# Patient Record
Sex: FEMALE | Race: WHITE | NOT HISPANIC OR LATINO | Employment: UNEMPLOYED | URBAN - METROPOLITAN AREA
[De-identification: names, ages, dates, MRNs, and addresses within clinical notes are randomized per-mention and may not be internally consistent; named-entity substitution may affect disease eponyms.]

---

## 2022-07-26 ENCOUNTER — HOSPITAL ENCOUNTER (EMERGENCY)
Facility: CLINIC | Age: 37
Discharge: HOME | End: 2022-07-26
Admitting: NURSE PRACTITIONER

## 2022-07-26 VITALS
HEIGHT: 65 IN | RESPIRATION RATE: 18 BRPM | OXYGEN SATURATION: 100 % | SYSTOLIC BLOOD PRESSURE: 158 MMHG | TEMPERATURE: 98.4 F | WEIGHT: 190 LBS | BODY MASS INDEX: 31.65 KG/M2 | HEART RATE: 92 BPM | DIASTOLIC BLOOD PRESSURE: 107 MMHG

## 2022-07-26 DIAGNOSIS — J02.9 ACUTE PHARYNGITIS, UNSPECIFIED ETIOLOGY: ICD-10-CM

## 2022-07-26 LAB
DEPRECATED S PYO AG THROAT QL EIA: NEGATIVE
GROUP A STREP BY PCR: NOT DETECTED

## 2022-07-26 PROCEDURE — 87651 STREP A DNA AMP PROBE: CPT | Performed by: NURSE PRACTITIONER

## 2022-07-26 PROCEDURE — 99283 EMERGENCY DEPT VISIT LOW MDM: CPT

## 2022-07-26 PROCEDURE — 250N000011 HC RX IP 250 OP 636: Performed by: NURSE PRACTITIONER

## 2022-07-26 RX ADMIN — DEXAMETHASONE 10 MG: 2 TABLET ORAL at 11:32

## 2022-07-26 ASSESSMENT — ENCOUNTER SYMPTOMS
VOMITING: 0
RHINORRHEA: 1
SHORTNESS OF BREATH: 1
FEVER: 0
CHILLS: 0
NECK STIFFNESS: 0
DIARRHEA: 0
SORE THROAT: 1

## 2022-07-26 NOTE — DISCHARGE INSTRUCTIONS
We believe that a viral infection is the cause of your sore throat today.  Your STREP test is NEGATIVE.  The pain is from the inflammation caused by theviral infection.  Antibiotics do not treat viruses and they do not help with the inflammation which is causing your pain.       TO RELIEVE YOUR SYMPTOMS AT HOME:  You should take ibuprofen, 600mg, threetimes per day as needed for pain.  You can also use acetaminophen, 650 mg, up to four times per day as needed for pain.  You can use these medications together, there are no concerning interactions.    Use warm salt water gargles at least 3 times a day to relieve pain.  Sucking cough lozenges during the day can also help reduce pain from sore throat.  Start Prilosec OTC symptoms could be related to acid reflux  Recheck in clinic within 1-2 week.  If your symptoms worsen prior to your follow up appointment, do not hesitate to return here to the emergency department for further evaluation. We'd be happy to see you again.   ---------------------------------------------------------------------------------------------------

## 2022-07-26 NOTE — ED TRIAGE NOTES
Patient reports that she was dx with Covid on the 13th, since then has had a sore throat that has not gone away, taking OTC analgesics without relief. Rupinder Sam RN.......7/26/2022 11:10 AM     Triage Assessment     Row Name 07/26/22 1110       Triage Assessment (Adult)    Airway WDL WDL       Respiratory WDL    Respiratory WDL WDL       Skin Circulation/Temperature WDL    Skin Circulation/Temperature WDL WDL       Cardiac WDL    Cardiac WDL WDL       Peripheral/Neurovascular WDL    Peripheral Neurovascular WDL WDL       Cognitive/Neuro/Behavioral WDL    Cognitive/Neuro/Behavioral WDL WDL

## 2022-07-26 NOTE — ED PROVIDER NOTES
EMERGENCY DEPARTMENT ENCOUNTER      NAME: Tessa Wharton  AGE: 37 year old female  YOB: 1985  MRN: [id number]  EVALUATION DATE & TIME: 2022 11:12 AM    PCP: No primary care provider on file.    ED PROVIDER: KATE Noel, CNP      Chief Complaint   Patient presents with     Pharyngitis         FINAL IMPRESSION:  1. Acute pharyngitis, unspecified etiology          ED COURSE & MEDICAL DECISION MAKIN:19 AM I met with the patient, obtained history, performed an initial exam, and discussed options and plan for treatment here in the ED.  12:31 PM updated patient.     Pertinent Labs & Imaging studies reviewed. (See chart for details)  37 year old female presents to the Emergency Department for evaluation of sore throat.  Symptoms for 2 weeks.  Minimal injection on exam.  No exudate or evidence for peritonsillar abscess.  She has no stridor or dysphonia.  Low suspicion for significant soft tissue infection and work-up was deferred.  Given dose of Decadron.  Did endorse some issues with acid reflux and discussed starting Prilosec OTC.  Otherwise, symptomatic management recommended.  Clinic follow-up in 1 to 2 weeks.  Provided with return precautions    At the conclusion of the encounter I discussed the results of all of the tests and the disposition. The questions were answered. The patient or family acknowledged understanding and was agreeable with the care plan.       MEDICATIONS GIVEN IN THE EMERGENCY:  Medications   dexamethasone (DECADRON) tablet 10 mg (10 mg Oral Given 22 1132)       NEW PRESCRIPTIONS STARTED AT TODAY'S ER VISIT  New Prescriptions    No medications on file            =================================================================    HPI    Patient information was obtained from: patient    Use of Intrepreter: N/A        Tessa Wharton is a 37 year old female who presents today for evaluation of sore throat.  Symptoms present for the past 2 weeks.  Did have  "COVID which was diagnosed on 7/13/2022.  Notes that fever and cough are resolved.  Continues to have sore throat which actually felt worse last night and today.  Pain increases with swallowing.  Last night she felt like she had some difficulty breathing.  Denies any ongoing vomiting or diarrhea.  Appetite has been improving.  Using over-the-counter medicines and drinking hot tea for symptom management.  Here requesting a strep test.      REVIEW OF SYSTEMS   Review of Systems   Constitutional: Negative for chills and fever.   HENT: Positive for rhinorrhea and sore throat.    Respiratory: Positive for shortness of breath (last night).    Gastrointestinal: Negative for diarrhea and vomiting.   Musculoskeletal: Negative for neck stiffness.   All other systems reviewed and are negative.      PAST MEDICAL HISTORY:  No past medical history on file.    PAST SURGICAL HISTORY:  No past surgical history on file.        CURRENT MEDICATIONS:    No current facility-administered medications for this encounter.     No current outpatient medications on file.         ALLERGIES:  No Known Allergies    FAMILY HISTORY:  No family history on file.    SOCIAL HISTORY:   Social History     Socioeconomic History     Marital status: Single         VITALS:  Patient Vitals for the past 24 hrs:   BP Temp Temp src Pulse Resp SpO2 Height Weight   07/26/22 1108 (!) 158/107 98.4  F (36.9  C) Oral 92 18 100 % 1.651 m (5' 5\") 86.2 kg (190 lb)       PHYSICAL EXAM    Constitutional:  Well developed, well nourished, no acute distress  EYES: Conjunctivae clear  HENT:  Atraumatic, normocephalic.  Tonsils are 1+.  Minimal erythema.  No exudate.  Uvula midline.  No trismus or stridor.  Bilateral upper cervical adenopathy.  No meningismus  Respiratory:  No respiratory distress, normal breath sounds  Cardiovascular:  Normal rate, normal rhythm, no murmurs  Integument: Warm, Dry, No erythema, No rash.   Neurologic:  Alert & oriented x 3              LAB:  All " pertinent labs reviewed and interpreted.  Labs Ordered and Resulted from Time of ED Arrival to Time of ED Departure   STREPTOCOCCUS A RAPID SCREEN W REFELX TO PCR - Normal       Result Value    Group A Strep antigen Negative     GROUP A STREPTOCOCCUS PCR THROAT SWAB         RADIOLOGY:  Reviewed all pertinent imaging. Please see official radiology report.  No orders to display             PROCEDURES:   None    KATE Noel, CNP  Emergency Medicine  [facility] EMERGENCY ROOM  [address] [facility] [id number] [phone]  Dept: [phone]         Kurt Torrez APRN CNP  07/26/22 1237

## 2022-10-03 ENCOUNTER — HEALTH MAINTENANCE LETTER (OUTPATIENT)
Age: 37
End: 2022-10-03

## 2023-10-22 ENCOUNTER — HEALTH MAINTENANCE LETTER (OUTPATIENT)
Age: 38
End: 2023-10-22

## 2024-12-15 ENCOUNTER — HEALTH MAINTENANCE LETTER (OUTPATIENT)
Age: 39
End: 2024-12-15